# Patient Record
Sex: MALE | Race: WHITE | Employment: FULL TIME | URBAN - METROPOLITAN AREA
[De-identification: names, ages, dates, MRNs, and addresses within clinical notes are randomized per-mention and may not be internally consistent; named-entity substitution may affect disease eponyms.]

---

## 2017-03-22 ENCOUNTER — HOSPITAL ENCOUNTER (OUTPATIENT)
Age: 47
Discharge: HOME | End: 2017-03-22
Attending: FAMILY MEDICINE
Payer: COMMERCIAL

## 2017-03-22 VITALS
TEMPERATURE: 99 F | WEIGHT: 174 LBS | RESPIRATION RATE: 20 BRPM | BODY MASS INDEX: 24.36 KG/M2 | SYSTOLIC BLOOD PRESSURE: 114 MMHG | HEART RATE: 70 BPM | OXYGEN SATURATION: 100 % | DIASTOLIC BLOOD PRESSURE: 93 MMHG | HEIGHT: 71 IN

## 2017-03-22 DIAGNOSIS — K52.9 GASTROENTERITIS: Primary | ICD-10-CM

## 2017-03-22 DIAGNOSIS — Z87.442 HISTORY OF NEPHROLITHIASIS: ICD-10-CM

## 2017-03-22 DIAGNOSIS — R31.9 HEMATURIA: ICD-10-CM

## 2017-03-22 DIAGNOSIS — B34.9 VIRAL SYNDROME: ICD-10-CM

## 2017-03-22 LAB
#LYMPHOCYTE IC: 1 X10ˆ3/UL (ref 0.9–3.2)
#MXD IC: 0.9 X10ˆ3/UL (ref 0.1–1)
#NEUTROPHIL IC: 2.8 X10ˆ3/UL (ref 1.3–6.7)
CREAT SERPL-MCNC: 0.9 MG/DL (ref 0.7–1.2)
GLUCOSE BLD-MCNC: 98 MG/DL (ref 65–99)
HCT IC: 44.8 % (ref 37–54)
HGB IC: 14.9 G/DL (ref 13–17)
ISTAT BLOOD GAS TCO2: 26 MMOL/L (ref 22–32)
ISTAT BUN: 10 MG/DL (ref 8–20)
ISTAT CHLORIDE: 98 MMOL/L (ref 101–111)
ISTAT HEMATOCRIT: 46 % (ref 37–54)
ISTAT IONIZED CALCIUM: 1.13 MMOL/L (ref 1.12–1.32)
ISTAT POTASSIUM: 3.9 MMOL/L (ref 3.6–5.1)
ISTAT SODIUM: 139 MMOL/L (ref 136–144)
LYMPHOCYTES NFR BLD AUTO: 21.1 %
MCH IC: 29 PG (ref 27–33.2)
MCHC IC: 33.3 G/DL (ref 31–37)
MCV IC: 87.3 FL (ref 80–99)
MIXED CELL %: 19.3 %
NEUTROPHILS NFR BLD AUTO: 59.6 %
PLT IC: 214 X10ˆ3/UL (ref 150–450)
POCT BILIRUBIN URINE: NEGATIVE
POCT GLUCOSE URINE: NEGATIVE MG/DL
POCT KETONE URINE: >=160 MG/DL
POCT LEUKOCYTE ESTERASE URINE: NEGATIVE
POCT NITRITE URINE: NEGATIVE
POCT PH URINE: 6 (ref 5–8)
POCT PROTEIN URINE: 30 MG/DL
POCT SPECIFIC GRAVITY URINE: 1.02
POCT URINE COLOR: YELLOW
POCT UROBILINOGEN URINE: 0.2 MG/DL
RBC IC: 5.13 X10ˆ6/UL (ref 4.3–5.7)
WBC IC: 4.7 X10ˆ3/UL (ref 4–13)

## 2017-03-22 PROCEDURE — 96360 HYDRATION IV INFUSION INIT: CPT

## 2017-03-22 PROCEDURE — 99204 OFFICE O/P NEW MOD 45 MIN: CPT

## 2017-03-22 PROCEDURE — 99215 OFFICE O/P EST HI 40 MIN: CPT

## 2017-03-22 PROCEDURE — 81002 URINALYSIS NONAUTO W/O SCOPE: CPT | Performed by: FAMILY MEDICINE

## 2017-03-22 PROCEDURE — 80047 BASIC METABLC PNL IONIZED CA: CPT

## 2017-03-22 PROCEDURE — 85025 COMPLETE CBC W/AUTO DIFF WBC: CPT | Performed by: FAMILY MEDICINE

## 2017-03-22 RX ORDER — ONDANSETRON 4 MG/1
4 TABLET, FILM COATED ORAL EVERY 6 HOURS PRN
Qty: 12 TABLET | Refills: 0 | Status: SHIPPED | OUTPATIENT
Start: 2017-03-22 | End: 2017-03-25

## 2017-03-22 RX ORDER — SODIUM CHLORIDE 9 MG/ML
1000 INJECTION, SOLUTION INTRAVENOUS ONCE
Status: COMPLETED | OUTPATIENT
Start: 2017-03-22 | End: 2017-03-22

## 2017-03-22 RX ORDER — ONDANSETRON 4 MG/1
4 TABLET, ORALLY DISINTEGRATING ORAL ONCE
Status: COMPLETED | OUTPATIENT
Start: 2017-03-22 | End: 2017-03-22

## 2017-03-22 NOTE — ED PROVIDER NOTES
Patient Seen in: THE [facility] In [facility]    History   Patient presents with:  Abdominal Pain  Diarrhea    Stated Complaint: STOMACH CRAMPS / [place]    HPI  49 yo M here with veins abdominal pain, midepigastric, radiating to the back, fe 180.3 cm (5' 11\")  Wt 78. 926 kg  BMI 24.28 kg/m2  SpO2 100%        Physical Exam    GEN: Not in any acute distress, making good conversation, answering appropriately   SKIN: No pallor, no erythema, no cyanosis, warm and dry  Eyes: wnl, normal conjunctiva Bananas, Rice, Applesauce and Toast for the next 48 hours and then advance diet slowly   OTC Probiotic Culturelle OR Florastor discussed     Disposition and Plan     Clinical Impression:  Gastroenteritis  (primary encounter diagnosis)  Hematuria  History o

## 2017-03-22 NOTE — ED INITIAL ASSESSMENT (HPI)
[facility] of abdominal pain midepigastric radiates to back. Fever at home 99. 5(temporal)x 3 days. +Cough and diarhea as well(4-5 stools yesterday no blood)+Nausea but no vomiting. States his \"kidneys hurt\"

## 2018-12-10 ENCOUNTER — OFFICE VISIT (OUTPATIENT)
Dept: INTERNAL MEDICINE CLINIC | Facility: CLINIC | Age: 48
End: 2018-12-10
Payer: COMMERCIAL

## 2018-12-10 ENCOUNTER — LAB ENCOUNTER (OUTPATIENT)
Dept: LAB | Age: 48
End: 2018-12-10
Attending: INTERNAL MEDICINE
Payer: COMMERCIAL

## 2018-12-10 VITALS
HEART RATE: 72 BPM | DIASTOLIC BLOOD PRESSURE: 80 MMHG | TEMPERATURE: 98 F | HEIGHT: 70.5 IN | SYSTOLIC BLOOD PRESSURE: 108 MMHG | WEIGHT: 174.25 LBS | BODY MASS INDEX: 24.67 KG/M2 | RESPIRATION RATE: 12 BRPM

## 2018-12-10 DIAGNOSIS — Z00.00 ENCOUNTER FOR PREVENTATIVE ADULT HEALTH CARE EXAMINATION: Primary | ICD-10-CM

## 2018-12-10 DIAGNOSIS — Z00.00 ENCOUNTER FOR PREVENTATIVE ADULT HEALTH CARE EXAMINATION: ICD-10-CM

## 2018-12-10 DIAGNOSIS — Z12.11 SCREENING FOR COLON CANCER: ICD-10-CM

## 2018-12-10 DIAGNOSIS — J30.89 NON-SEASONAL ALLERGIC RHINITIS, UNSPECIFIED TRIGGER: ICD-10-CM

## 2018-12-10 PROCEDURE — 80061 LIPID PANEL: CPT

## 2018-12-10 PROCEDURE — 85025 COMPLETE CBC W/AUTO DIFF WBC: CPT

## 2018-12-10 PROCEDURE — 80053 COMPREHEN METABOLIC PANEL: CPT

## 2018-12-10 PROCEDURE — 84443 ASSAY THYROID STIM HORMONE: CPT

## 2018-12-10 PROCEDURE — 83036 HEMOGLOBIN GLYCOSYLATED A1C: CPT

## 2018-12-10 PROCEDURE — 36415 COLL VENOUS BLD VENIPUNCTURE: CPT

## 2018-12-10 PROCEDURE — 99386 PREV VISIT NEW AGE 40-64: CPT | Performed by: INTERNAL MEDICINE

## 2018-12-10 NOTE — PATIENT INSTRUCTIONS
- Get blood work done  - Follow up with General Surgery to discuss your colonoscopy. Dr. Rj Lima is here on Mondays and Dr. Rachelle Davidson is here on Tuesdays. It was a pleasure seeing you in the clinic today.   [facility] the [facility]

## 2018-12-10 NOTE — PROGRESS NOTES
Corwin Dillard is a 46year old male. HPI:   Patient presents with:  New Patient  Physical: - Non Fasting   Patient presents for CPX/wellness examination and to re-establish care. Last seen in our clinic in 2012, last seen at [facility] in 2014.    Diet:  Tri : 166 lb  02/03/12 : 168 lb 6.4 oz    EXAM:   /80 (BP Location: Left arm, Patient Position: Sitting, Cuff Size: adult)   Pulse 72   Temp 98.1 °F (36.7 °C) (Oral)   Resp 12   Ht 70.5\"   Wt 174 lb 4 oz   BMI 24.65 kg/m²   GENERAL: Alert and oriented,

## 2018-12-19 ENCOUNTER — TELEPHONE (OUTPATIENT)
Dept: INTERNAL MEDICINE CLINIC | Facility: CLINIC | Age: 48
End: 2018-12-19

## 2019-01-31 ENCOUNTER — OFFICE VISIT (OUTPATIENT)
Dept: FAMILY MEDICINE CLINIC | Facility: CLINIC | Age: 49
End: 2019-01-31
Payer: COMMERCIAL

## 2019-01-31 VITALS
RESPIRATION RATE: 18 BRPM | TEMPERATURE: 99 F | WEIGHT: 174 LBS | DIASTOLIC BLOOD PRESSURE: 80 MMHG | BODY MASS INDEX: 24.63 KG/M2 | SYSTOLIC BLOOD PRESSURE: 126 MMHG | HEART RATE: 96 BPM | HEIGHT: 70.5 IN | OXYGEN SATURATION: 100 %

## 2019-01-31 DIAGNOSIS — J02.9 SORE THROAT: ICD-10-CM

## 2019-01-31 DIAGNOSIS — B34.9 VIRAL SYNDROME: Primary | ICD-10-CM

## 2019-01-31 LAB
CONTROL LINE PRESENT WITH A CLEAR BACKGROUND (YES/NO): YES YES/NO
STREP GRP A CUL-SCR: NEGATIVE

## 2019-01-31 PROCEDURE — 87880 STREP A ASSAY W/OPTIC: CPT | Performed by: NURSE PRACTITIONER

## 2019-01-31 PROCEDURE — 99213 OFFICE O/P EST LOW 20 MIN: CPT | Performed by: NURSE PRACTITIONER

## 2019-01-31 NOTE — PROGRESS NOTES
CHIEF COMPLAINT:   Patient presents with:  Sore Throat: nasal congestion, mild cough, body aches x 4 days        HPI:   Brad Rogel is a 46year old male presents to clinic with complaint of sore throat x 2 days.   Reports 2-3 days ago he developed THROAT: oral mucosa pink, moist. Posterior pharynx mildly erythematous and injected. No exudates. Tonsils 2+/4. Breath is not malodorous. No trismus, hoarseness, muffled voice, stridor, or uvular deviation.     NECK: supple  LUNGS: clear to auscultation b · Keep your throat moist by drinking 6 or more glasses of clear liquids every day. · Run a cool-air humidifier in your room overnight. · Avoid cigarette smoke.   · Suck on throat lozenges, cough drops, hard candy, ice chips, or frozen fruit-juice bars.  U © 0933-7853 [employer] [address]. [address] [facility], [address] [place]. All rights reserved. This information is not intended as a substitute for professional medical care. Always follow your healthcare professional's instructions.         Viral S · Your appetite may be poor, so a light diet is fine. Avoid dehydration by drinking 8 to 12, 8-ounce glasses of fluids each day.  This may include water; orange juice; lemonade; apple, grape, and cranberry juice; clear fruit drinks; electrolyte replacement © 7106-0404 [employer] [address]. [address] [facility], [facility] [place]. All rights reserved. This information is not intended as a substitute for professional medical care. Always follow your healthcare professional's instructions.

## 2019-01-31 NOTE — PATIENT INSTRUCTIONS
Self-Care for Sore Throats    Sore throats happen for many reasons, such as colds, allergies, and infections caused by viruses or bacteria. In any case, your throat becomes red and sore.  Your goal for self-care is to reduce your discomfort while giving y Contact your healthcare provider if you have:  · A temperature over 101°F (38.3°C)  · White spots on the throat  · Great difficulty swallowing  · Trouble breathing  · A skin rash  · Recent exposure to someone else with strep bacteria  · Severe hoarseness a · You may use over-the-counter acetaminophen or ibuprofen for fever, muscle aching, and headache, unless another medicine was prescribed for this.  If you have chronic liver or kidney disease or ever had a stomach ulcer or gastrointestinal bleeding, talk wi · Frequent diarrhea (more than 5 times a day); blood (red or black color) or mucus in diarrhea  · Feeling weak, dizzy, or like you are going to faint  · Extreme thirst  · Fever of 100.4°F (38°C) or higher, or as directed by your healthcare provider  Date L

## 2021-11-01 ENCOUNTER — OFFICE VISIT (OUTPATIENT)
Dept: INTERNAL MEDICINE CLINIC | Facility: CLINIC | Age: 51
End: 2021-11-01
Payer: COMMERCIAL

## 2021-11-01 VITALS
SYSTOLIC BLOOD PRESSURE: 112 MMHG | TEMPERATURE: 97 F | BODY MASS INDEX: 25.65 KG/M2 | RESPIRATION RATE: 16 BRPM | DIASTOLIC BLOOD PRESSURE: 72 MMHG | HEART RATE: 82 BPM | WEIGHT: 181.19 LBS | HEIGHT: 70.5 IN | OXYGEN SATURATION: 98 %

## 2021-11-01 DIAGNOSIS — Z12.5 PROSTATE CANCER SCREENING: ICD-10-CM

## 2021-11-01 DIAGNOSIS — Z13.0 SCREENING FOR DEFICIENCY ANEMIA: ICD-10-CM

## 2021-11-01 DIAGNOSIS — Z00.00 WELLNESS EXAMINATION: Primary | ICD-10-CM

## 2021-11-01 DIAGNOSIS — Z87.898 HISTORY OF PREDIABETES: ICD-10-CM

## 2021-11-01 DIAGNOSIS — Z12.11 ENCOUNTER FOR SCREENING COLONOSCOPY: ICD-10-CM

## 2021-11-01 DIAGNOSIS — Z13.220 LIPID SCREENING: ICD-10-CM

## 2021-11-01 DIAGNOSIS — Z13.29 SCREENING FOR THYROID DISORDER: ICD-10-CM

## 2021-11-01 PROCEDURE — 3078F DIAST BP <80 MM HG: CPT | Performed by: FAMILY MEDICINE

## 2021-11-01 PROCEDURE — 99386 PREV VISIT NEW AGE 40-64: CPT | Performed by: FAMILY MEDICINE

## 2021-11-01 PROCEDURE — 3074F SYST BP LT 130 MM HG: CPT | Performed by: FAMILY MEDICINE

## 2021-11-01 PROCEDURE — 3008F BODY MASS INDEX DOCD: CPT | Performed by: FAMILY MEDICINE

## 2021-11-01 NOTE — PROGRESS NOTES
Lizzette Mejia  3/8/1970    Patient presents with:  Establish Care: [facility]  Physical      HPI:   Corrie Handley is a 46year old male who presents to establish care. He has not seen a PCP in few years. He is taking no medications.  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

## 2021-12-08 ENCOUNTER — LAB ENCOUNTER (OUTPATIENT)
Dept: LAB | Facility: HOSPITAL | Age: 51
End: 2021-12-08
Attending: INTERNAL MEDICINE
Payer: COMMERCIAL

## 2021-12-08 ENCOUNTER — IMMUNIZATION (OUTPATIENT)
Dept: LAB | Facility: HOSPITAL | Age: 51
End: 2021-12-08
Attending: EMERGENCY MEDICINE
Payer: COMMERCIAL

## 2021-12-08 DIAGNOSIS — Z23 NEED FOR VACCINATION: Primary | ICD-10-CM

## 2021-12-08 DIAGNOSIS — Z01.818 PRE-OP TESTING: ICD-10-CM

## 2021-12-08 PROCEDURE — 0004A SARSCOV2 VAC 30MCG/0.3ML IM: CPT

## 2021-12-11 PROBLEM — D12.2 BENIGN NEOPLASM OF ASCENDING COLON: Status: ACTIVE | Noted: 2021-12-11

## 2021-12-11 PROBLEM — Z12.11 SPECIAL SCREENING FOR MALIGNANT NEOPLASM OF COLON: Status: ACTIVE | Noted: 2021-12-11

## 2023-08-08 ENCOUNTER — TELEPHONE (OUTPATIENT)
Dept: INTERNAL MEDICINE CLINIC | Facility: CLINIC | Age: 53
End: 2023-08-08

## 2023-08-08 DIAGNOSIS — Z13.228 SCREENING FOR METABOLIC DISORDER: ICD-10-CM

## 2023-08-08 DIAGNOSIS — Z12.5 SCREENING FOR MALIGNANT NEOPLASM OF PROSTATE: ICD-10-CM

## 2023-08-08 DIAGNOSIS — Z00.00 ROUTINE GENERAL MEDICAL EXAMINATION AT A HEALTH CARE FACILITY: Primary | ICD-10-CM

## 2023-08-08 DIAGNOSIS — Z13.0 SCREENING FOR DISORDER OF BLOOD AND BLOOD-FORMING ORGANS: ICD-10-CM

## 2023-08-08 DIAGNOSIS — Z13.29 SCREENING FOR THYROID DISORDER: ICD-10-CM

## 2023-08-08 DIAGNOSIS — Z13.220 SCREENING FOR LIPID DISORDERS: ICD-10-CM

## 2023-08-08 NOTE — TELEPHONE ENCOUNTER
Future Appointments   Date Time Provider Ben Mccracken   9/8/2023  3:20 PM Lurdes Castrejon MD [facility]     Orders to  quest-Pt informed that labs need to be completed no sooner than 2 weeks prior to the appt.  Pt aware to fast-no call back required

## 2023-09-08 ENCOUNTER — OFFICE VISIT (OUTPATIENT)
Dept: INTERNAL MEDICINE CLINIC | Facility: CLINIC | Age: 53
End: 2023-09-08
Payer: COMMERCIAL

## 2023-09-08 VITALS
HEIGHT: 70 IN | BODY MASS INDEX: 26 KG/M2 | OXYGEN SATURATION: 98 % | SYSTOLIC BLOOD PRESSURE: 116 MMHG | TEMPERATURE: 97 F | DIASTOLIC BLOOD PRESSURE: 78 MMHG | HEART RATE: 65 BPM | RESPIRATION RATE: 18 BRPM | WEIGHT: 181.63 LBS

## 2023-09-08 DIAGNOSIS — Z00.00 WELLNESS EXAMINATION: Primary | ICD-10-CM

## 2023-09-08 DIAGNOSIS — D12.6 ADENOMA OF COLON: ICD-10-CM

## 2023-09-08 DIAGNOSIS — G89.29 CHRONIC RIGHT SHOULDER PAIN: ICD-10-CM

## 2023-09-08 DIAGNOSIS — Z12.5 SCREENING FOR PROSTATE CANCER: ICD-10-CM

## 2023-09-08 DIAGNOSIS — J30.89 NON-SEASONAL ALLERGIC RHINITIS, UNSPECIFIED TRIGGER: ICD-10-CM

## 2023-09-08 DIAGNOSIS — M25.511 CHRONIC RIGHT SHOULDER PAIN: ICD-10-CM

## 2023-09-08 PROCEDURE — 90471 IMMUNIZATION ADMIN: CPT | Performed by: FAMILY MEDICINE

## 2023-09-08 PROCEDURE — 3008F BODY MASS INDEX DOCD: CPT | Performed by: FAMILY MEDICINE

## 2023-09-08 PROCEDURE — 90750 HZV VACC RECOMBINANT IM: CPT | Performed by: FAMILY MEDICINE

## 2023-09-08 PROCEDURE — 90715 TDAP VACCINE 7 YRS/> IM: CPT | Performed by: FAMILY MEDICINE

## 2023-09-08 PROCEDURE — 3078F DIAST BP <80 MM HG: CPT | Performed by: FAMILY MEDICINE

## 2023-09-08 PROCEDURE — 99396 PREV VISIT EST AGE 40-64: CPT | Performed by: FAMILY MEDICINE

## 2023-09-08 PROCEDURE — 3074F SYST BP LT 130 MM HG: CPT | Performed by: FAMILY MEDICINE

## 2023-09-08 PROCEDURE — 90472 IMMUNIZATION ADMIN EACH ADD: CPT | Performed by: FAMILY MEDICINE

## 2023-09-08 NOTE — PROGRESS NOTES
Subjective:   Alvarado Shah is a 52year old male who presents for No chief complaint on file. Last office visit here was 11/1/21. Any health changes since? No  Current medications? 2 allergy shots every 3 weeks    Fam hx of colon or prostate cancer? No  Colonoscopy? 12/11/2021 polyp removed, recommended to do another one in 10 years. Also found internal hemorrhoids. Smoking? no  Drinking? Wine, 2-3 a week on weekends. Illicit drug use? No never  Exercise? On the weekends (sat-Sunday), does little cardio and does [employer] machines. 1.5 hours each day. Diet? He says its okay, only eats out on Saturday nights. Skips breakfast and eats first meal around 11 and then dinner around 5, quick meals but no take out. Eats a ton of yogurt, apple, and v8. Does not drink soda. 12 oz of coffee every morning.   - not drinking a lot of water. Sleep? Usually goes to bed by 9 and wakes up at 3. Any health concerns? None    Recent travel/upcoming travel? March went to [place], and 4th of July went to [place] for [employer] [place]. 11 out of 46 in age group. Only thing is waking up with some joint pain in shoulder. But then after a little while he feels fine.      Wale Gonsalez, 9/8/2023, 3:12 PM

## 2023-09-25 ENCOUNTER — TELEPHONE (OUTPATIENT)
Dept: INTERNAL MEDICINE CLINIC | Facility: CLINIC | Age: 53
End: 2023-09-25

## 2023-09-25 LAB
ABSOLUTE BASOPHILS: 104 CELLS/UL (ref 0–200)
ABSOLUTE EOSINOPHILS: 555 CELLS/UL (ref 15–500)
ABSOLUTE LYMPHOCYTES: 1854 CELLS/UL (ref 850–3900)
ABSOLUTE MONOCYTES: 799 CELLS/UL (ref 200–950)
ABSOLUTE NEUTROPHILS: 2788 CELLS/UL (ref 1500–7800)
ALBUMIN/GLOBULIN RATIO: 1.7 (CALC) (ref 1–2.5)
ALBUMIN: 4.6 G/DL (ref 3.6–5.1)
ALKALINE PHOSPHATASE: 35 U/L (ref 35–144)
ALT: 17 U/L (ref 9–46)
AST: 18 U/L (ref 10–35)
BASOPHILS: 1.7 %
BILIRUBIN, TOTAL: 0.8 MG/DL (ref 0.2–1.2)
BUN: 17 MG/DL (ref 7–25)
CALCIUM: 9.9 MG/DL (ref 8.6–10.3)
CARBON DIOXIDE: 27 MMOL/L (ref 20–32)
CHLORIDE: 103 MMOL/L (ref 98–110)
CHOL/HDLC RATIO: 4.2 (CALC)
CHOLESTEROL, TOTAL: 220 MG/DL
CREATININE: 1.06 MG/DL (ref 0.7–1.3)
EGFR: 83 ML/MIN/1.73M2
EOSINOPHILS: 9.1 %
GLOBULIN: 2.7 G/DL (CALC) (ref 1.9–3.7)
GLUCOSE: 105 MG/DL (ref 65–99)
HDL CHOLESTEROL: 53 MG/DL
HEMATOCRIT: 44.8 % (ref 38.5–50)
HEMOGLOBIN: 14.7 G/DL (ref 13.2–17.1)
LDL-CHOLESTEROL: 140 MG/DL (CALC)
LYMPHOCYTES: 30.4 %
MCH: 29.1 PG (ref 27–33)
MCHC: 32.8 G/DL (ref 32–36)
MCV: 88.7 FL (ref 80–100)
MONOCYTES: 13.1 %
MPV: 9.9 FL (ref 7.5–12.5)
NEUTROPHILS: 45.7 %
NON-HDL CHOLESTEROL: 167 MG/DL (CALC)
PLATELET COUNT: 294 THOUSAND/UL (ref 140–400)
POTASSIUM: 4.6 MMOL/L (ref 3.5–5.3)
PROTEIN, TOTAL: 7.3 G/DL (ref 6.1–8.1)
RDW: 13.2 % (ref 11–15)
RED BLOOD CELL COUNT: 5.05 MILLION/UL (ref 4.2–5.8)
SODIUM: 141 MMOL/L (ref 135–146)
TOTAL PSA: 1.6 NG/ML
TRIGLYCERIDES: 145 MG/DL
TSH W/REFLEX TO FT4: 1.42 MIU/L (ref 0.4–4.5)
WHITE BLOOD CELL COUNT: 6.1 THOUSAND/UL (ref 3.8–10.8)

## 2023-11-08 ENCOUNTER — TELEPHONE (OUTPATIENT)
Dept: INTERNAL MEDICINE CLINIC | Facility: CLINIC | Age: 53
End: 2023-11-08

## 2023-11-08 DIAGNOSIS — Z23 NEED FOR SHINGLES VACCINE: Primary | ICD-10-CM

## 2023-11-08 NOTE — TELEPHONE ENCOUNTER
Future Appointments   Date Time Provider Ben Mccracken   12/6/2023  4:15 PM [facility] N[facility]     Pt coming for #2 shingles inj-please enter order

## 2023-11-08 NOTE — TELEPHONE ENCOUNTER
LOV 9/8/23 with [address] Suraj Decker. Pended #2 Shingrix scheduled for 12/6/23. OK to order?     Zoster Vaccine Recombinant Adjuvanted (Shingrix)9/8/2023

## 2023-12-06 ENCOUNTER — NURSE ONLY (OUTPATIENT)
Dept: INTERNAL MEDICINE CLINIC | Facility: CLINIC | Age: 53
End: 2023-12-06
Payer: COMMERCIAL

## 2023-12-06 PROCEDURE — 90750 HZV VACC RECOMBINANT IM: CPT | Performed by: FAMILY MEDICINE

## 2023-12-06 PROCEDURE — 90471 IMMUNIZATION ADMIN: CPT | Performed by: FAMILY MEDICINE

## 2024-05-14 ENCOUNTER — OFFICE VISIT (OUTPATIENT)
Dept: INTERNAL MEDICINE CLINIC | Facility: CLINIC | Age: 54
End: 2024-05-14

## 2024-05-14 VITALS
WEIGHT: 191.63 LBS | TEMPERATURE: 97 F | DIASTOLIC BLOOD PRESSURE: 74 MMHG | SYSTOLIC BLOOD PRESSURE: 124 MMHG | BODY MASS INDEX: 27 KG/M2 | HEART RATE: 72 BPM | OXYGEN SATURATION: 98 %

## 2024-05-14 DIAGNOSIS — R22.2 MASS OF LEFT CHEST WALL: Primary | ICD-10-CM

## 2024-05-14 PROCEDURE — 99213 OFFICE O/P EST LOW 20 MIN: CPT | Performed by: FAMILY MEDICINE

## 2024-05-14 NOTE — PROGRESS NOTES
Hu Mejia  3/8/1970    Chief Complaint   Patient presents with    Lump     lump left side rib cage below armpit   First noticed it 2 month ago denies pain        HPI:   Hu Mejia is a 54 year old male who presents for evaluation of a subcutaneous lump on the left lateral chest wall that he noticed about 2 months ago.  He denies pain or discomfort.  He is overall unsure how long it has been there and is unsure if it has grown..    No current outpatient medications on file.      Allergies   Allergen Reactions    Dust     Grass     Mold     Ragweed     Tree, Elm       History reviewed. No pertinent past medical history.   Patient Active Problem List   Diagnosis    Allergic rhinitis    Special screening for malignant neoplasm of colon    Benign neoplasm of ascending colon    Adenoma of colon      History reviewed. No pertinent surgical history.   Family History   Problem Relation Age of Onset    Hypertension Father     Other (A-Fib) Brother         icd      Social History     Socioeconomic History    Marital status:    Tobacco Use    Smoking status: Never    Smokeless tobacco: Never   Vaping Use    Vaping status: Never Used   Substance and Sexual Activity    Alcohol use: Yes     Alcohol/week: 2.0 - 3.0 standard drinks of alcohol     Types: 2 - 3 Glasses of wine per week    Drug use: No   Other Topics Concern    Caffeine Concern Yes    Exercise Yes     Comment: 4 x week    Seat Belt Yes         REVIEW OF SYSTEMS:   GENERAL: feels well otherwise    EXAM:   /74 (BP Location: Right arm, Patient Position: Sitting, Cuff Size: adult)   Pulse 72   Temp 97.2 °F (36.2 °C) (Temporal)   Wt 191 lb 9.6 oz (86.9 kg)   SpO2 98%   BMI 27.49 kg/m²   GENERAL: Well developed, well nourished,in no apparent distress  SKIN/CHEST: Nontender, mobile, soft subcutaneous mass on the left anterior lateral chest wall without overlying skin changes.    ASSESSMENT AND PLAN:   Hu Mejia is a 54 year old male who  presents for evaluation.    Mass of left chest wall  Suspect lipoma.  Will further evaluate with ultrasound.  Further recommendations pending results of imaging.  - US BACK UPPER/CHEST WALL(CPT=76604); Future      All questions were answered and the patient agrees with the plan.     Thank you,  Wil Bagley MD

## 2024-05-28 ENCOUNTER — HOSPITAL ENCOUNTER (OUTPATIENT)
Dept: ULTRASOUND IMAGING | Age: 54
Discharge: HOME | End: 2024-05-28
Attending: FAMILY MEDICINE

## 2024-05-28 DIAGNOSIS — R22.2 MASS OF LEFT CHEST WALL: ICD-10-CM

## 2024-05-28 PROCEDURE — 76604 US EXAM CHEST: CPT | Performed by: FAMILY MEDICINE

## 2024-09-13 ENCOUNTER — HOSPITAL ENCOUNTER (OUTPATIENT)
Dept: GENERAL RADIOLOGY | Age: 54
Discharge: HOME | End: 2024-09-13
Attending: FAMILY MEDICINE
Payer: COMMERCIAL

## 2024-09-13 ENCOUNTER — OFFICE VISIT (OUTPATIENT)
Dept: INTERNAL MEDICINE CLINIC | Facility: CLINIC | Age: 54
End: 2024-09-13
Payer: COMMERCIAL

## 2024-09-13 VITALS
HEART RATE: 74 BPM | HEIGHT: 70 IN | BODY MASS INDEX: 27.49 KG/M2 | OXYGEN SATURATION: 94 % | DIASTOLIC BLOOD PRESSURE: 64 MMHG | RESPIRATION RATE: 18 BRPM | SYSTOLIC BLOOD PRESSURE: 110 MMHG | WEIGHT: 192 LBS

## 2024-09-13 DIAGNOSIS — Z13.29 SCREENING FOR THYROID DISORDER: ICD-10-CM

## 2024-09-13 DIAGNOSIS — D17.1 LIPOMA OF CHEST WALL: ICD-10-CM

## 2024-09-13 DIAGNOSIS — Z13.228 SCREENING FOR METABOLIC DISORDER: ICD-10-CM

## 2024-09-13 DIAGNOSIS — G89.29 CHRONIC RIGHT SHOULDER PAIN: ICD-10-CM

## 2024-09-13 DIAGNOSIS — Z00.00 WELLNESS EXAMINATION: Primary | ICD-10-CM

## 2024-09-13 DIAGNOSIS — J30.89 NON-SEASONAL ALLERGIC RHINITIS, UNSPECIFIED TRIGGER: ICD-10-CM

## 2024-09-13 DIAGNOSIS — Z13.0 SCREENING FOR DISORDER OF BLOOD AND BLOOD-FORMING ORGANS: ICD-10-CM

## 2024-09-13 DIAGNOSIS — Z13.220 SCREENING FOR LIPID DISORDERS: ICD-10-CM

## 2024-09-13 DIAGNOSIS — M25.511 CHRONIC RIGHT SHOULDER PAIN: ICD-10-CM

## 2024-09-13 DIAGNOSIS — D12.6 ADENOMA OF COLON: ICD-10-CM

## 2024-09-13 PROCEDURE — 99396 PREV VISIT EST AGE 40-64: CPT | Performed by: FAMILY MEDICINE

## 2024-09-13 PROCEDURE — 73030 X-RAY EXAM OF SHOULDER: CPT | Performed by: FAMILY MEDICINE

## 2024-09-13 NOTE — PROGRESS NOTES
Hu Mejia  3/8/1970    Chief Complaint   Patient presents with    Physical     Pt presents for an annual physical.   Pt due for annual labs.  Pt is due for the following vaccines:       HPI:   Hu Mejia is a 54 year old male who presents for CPE. He has overall been diong well. He was not able to complete his labs prior to our visit. He has struggled with activity dependant right shoulder pain. Mainly with reaching and overhead lifting.     No current outpatient medications on file.      Allergies   Allergen Reactions    Dust     Grass     Mold     Ragweed     Tree, Elm       History reviewed. No pertinent past medical history.   Patient Active Problem List   Diagnosis    Allergic rhinitis    Special screening for malignant neoplasm of colon    Benign neoplasm of ascending colon    Adenoma of colon      History reviewed. No pertinent surgical history.   Family History   Problem Relation Age of Onset    Hypertension Father     Other (A-Fib) Brother         icd      Social History     Socioeconomic History    Marital status:    Tobacco Use    Smoking status: Never    Smokeless tobacco: Never   Vaping Use    Vaping status: Never Used   Substance and Sexual Activity    Alcohol use: Yes     Alcohol/week: 2.0 - 3.0 standard drinks of alcohol     Types: 2 - 3 Glasses of wine per week    Drug use: No   Other Topics Concern    Caffeine Concern Yes    Exercise Yes     Comment: 4 x week    Seat Belt Yes         REVIEW OF SYSTEMS:   GENERAL: feels well otherwise  SKIN: no rash  EYES: no new vision changes  HEENT: not congested  LUNGS: no new dyspnea  CARDIOVASCULAR: no new chest pain  GI: no new abdominal pain  NEURO: no headaches    EXAM:   /64   Pulse 74   Resp 18   Ht 5' 10\" (1.778 m)   Wt 192 lb (87.1 kg)   SpO2 94%   BMI 27.55 kg/m²   GENERAL: Well developed, well nourished,in no apparent distress  SKIN: No rashes,no suspicious lesions  EYES: PERRLA, EOMI, conjunctiva are clear  HEENT:  atraumatic, normocephalic,ears and throat are clear  NECK: supple,no adenopathy,no bruits  LUNGS: clear to auscultation  CARDIO: RRR without murmur  GI: good BS's,no masses, HSM or tenderness  MSK: left  shoulder reveals full ROM, Pos impingement testing and resisted abduction and external rotation    ASSESSMENT AND PLAN:   Hu Mejia is a 54 year old male who presents for CPE    Wellness examination  Discussed age appropriate health and wellness. Screening labs ordered.     Lipoma of chest wall  Stable, no pain, no increased growth    Non-seasonal allergic rhinitis, unspecified trigger  Following with allergist. Will be weaning off immunotherapy    Adenoma of colon  Colonoscopy UTD    Chronic right shoulder pain  Provided home rehabilitation program. Will begin evaluation with radiographs.   - XR Shoulder right complete (Min 2 views) - [facility] Consult Only; Future      All questions were answered and the patient agrees with the plan.     Thank you,  Wil Bagley MD

## 2024-09-24 LAB
ABSOLUTE BASOPHILS: 107 CELLS/UL (ref 0–200)
ABSOLUTE EOSINOPHILS: 482 CELLS/UL (ref 15–500)
ABSOLUTE LYMPHOCYTES: 2178 CELLS/UL (ref 850–3900)
ABSOLUTE MONOCYTES: 650 CELLS/UL (ref 200–950)
ABSOLUTE NEUTROPHILS: 3283 CELLS/UL (ref 1500–7800)
ALBUMIN/GLOBULIN RATIO: 1.8 (CALC) (ref 1–2.5)
ALBUMIN: 4.8 G/DL (ref 3.6–5.1)
ALKALINE PHOSPHATASE: 31 U/L (ref 35–144)
ALT: 17 U/L (ref 9–46)
AST: 17 U/L (ref 10–35)
BASOPHILS: 1.6 %
BILIRUBIN, TOTAL: 1.1 MG/DL (ref 0.2–1.2)
BUN: 17 MG/DL (ref 7–25)
CALCIUM: 9.7 MG/DL (ref 8.6–10.3)
CARBON DIOXIDE: 30 MMOL/L (ref 20–32)
CHLORIDE: 102 MMOL/L (ref 98–110)
CHOL/HDLC RATIO: 4.3 (CALC)
CHOLESTEROL, TOTAL: 226 MG/DL
CREATININE: 0.96 MG/DL (ref 0.7–1.3)
EGFR: 93 ML/MIN/1.73M2
EOSINOPHILS: 7.2 %
GLOBULIN: 2.6 G/DL (CALC) (ref 1.9–3.7)
GLUCOSE: 98 MG/DL (ref 65–99)
HDL CHOLESTEROL: 53 MG/DL
HEMATOCRIT: 47.8 % (ref 38.5–50)
HEMOGLOBIN: 15.6 G/DL (ref 13.2–17.1)
LDL-CHOLESTEROL: 145 MG/DL (CALC)
LYMPHOCYTES: 32.5 %
MCH: 29.3 PG (ref 27–33)
MCHC: 32.6 G/DL (ref 32–36)
MCV: 89.8 FL (ref 80–100)
MONOCYTES: 9.7 %
MPV: 9.9 FL (ref 7.5–12.5)
NEUTROPHILS: 49 %
NON-HDL CHOLESTEROL: 173 MG/DL (CALC)
PLATELET COUNT: 289 THOUSAND/UL (ref 140–400)
POTASSIUM: 4.4 MMOL/L (ref 3.5–5.3)
PROTEIN, TOTAL: 7.4 G/DL (ref 6.1–8.1)
RDW: 13.5 % (ref 11–15)
RED BLOOD CELL COUNT: 5.32 MILLION/UL (ref 4.2–5.8)
SODIUM: 140 MMOL/L (ref 135–146)
TRIGLYCERIDES: 146 MG/DL
TSH W/REFLEX TO FT4: 2.38 MIU/L (ref 0.4–4.5)
WHITE BLOOD CELL COUNT: 6.7 THOUSAND/UL (ref 3.8–10.8)

## (undated) NOTE — ED AVS SNAPSHOT
[facility] in [address] [place] [address] Drive,[address]    [address] [place] [address]    Phone:  [phone]    Fax:  [phone]           Hu Talavera   MRN: [id number]    Department:  THE [facility] in [facility]   Date of Visit:  3/22/2017 Should hydrate your self well     Gastroenteritis   Rx Zofran 4 mg every 8 hours as needed for nausea or vomiting   Adequate hydration with Pedialyte, free water or popsicles advised   Continue to monitor input and output   Urine should be a light yellow c a substitute for ongoing medical care. Often, one Immediate Care visit does not uncover every injury or illness.  If you have been referred to a primary care or a specialist physician for a follow-up visit, please tell this physician (or your personal docto Tez Schneider [address] [facility] [address]) [phone]                Additional Information       We are concerned for your overall well being:    - If you are a smoker or have smoked in the last 12 months, we encourage you to explore options for [facility] [employer]

## (undated) NOTE — LETTER
Date: 1/31/2019    Patient Name: Leslie Steiner          To Whom it may concern: This letter has been written at the patient's request. The above patient was seen at the [facility] for treatment of a medical condition.     This patient sh